# Patient Record
Sex: FEMALE | Race: BLACK OR AFRICAN AMERICAN | NOT HISPANIC OR LATINO | Employment: FULL TIME | RURAL
[De-identification: names, ages, dates, MRNs, and addresses within clinical notes are randomized per-mention and may not be internally consistent; named-entity substitution may affect disease eponyms.]

---

## 2022-08-25 ENCOUNTER — HOSPITAL ENCOUNTER (EMERGENCY)
Facility: HOSPITAL | Age: 32
Discharge: HOME | End: 2022-08-25

## 2022-08-25 VITALS
OXYGEN SATURATION: 99 % | HEART RATE: 81 BPM | TEMPERATURE: 98 F | DIASTOLIC BLOOD PRESSURE: 82 MMHG | RESPIRATION RATE: 17 BRPM | SYSTOLIC BLOOD PRESSURE: 132 MMHG | HEIGHT: 66 IN

## 2022-08-25 DIAGNOSIS — S56.011A: Primary | ICD-10-CM

## 2022-08-25 PROCEDURE — 99284 PR EMERGENCY DEPT VISIT,LEVEL IV: ICD-10-PCS | Mod: ,,, | Performed by: NURSE PRACTITIONER

## 2022-08-25 PROCEDURE — 99283 EMERGENCY DEPT VISIT LOW MDM: CPT

## 2022-08-25 PROCEDURE — 99284 EMERGENCY DEPT VISIT MOD MDM: CPT | Mod: ,,, | Performed by: NURSE PRACTITIONER

## 2022-08-25 RX ORDER — IBUPROFEN 800 MG/1
800 TABLET ORAL 3 TIMES DAILY
Qty: 20 TABLET | Refills: 0 | Status: SHIPPED | OUTPATIENT
Start: 2022-08-25

## 2022-08-25 NOTE — Clinical Note
"Smita Varelalida Perez was seen and treated in our emergency department on 8/25/2022.  She may return to work on 08/28/2022.       If you have any questions or concerns, please don't hesitate to call.      MARTHA Rogers"

## 2022-08-26 NOTE — ED PROVIDER NOTES
Encounter Date: 8/25/2022       History     Chief Complaint   Patient presents with    Hand Pain     Patient presents to ER with complaint of right hand pain and cramping.  Patient states symptoms have been ongoing x 2 weeks.  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  She states over the last 2 days the pain has became unbearable.  She denies injury or known accident.  Her work asked her to be evaluated.     The history is provided by the patient. No  was used.     Review of patient's allergies indicates:  No Known Allergies  History reviewed. No pertinent past medical history.  History reviewed. No pertinent surgical history.  History reviewed. No pertinent family history.  Social History     Tobacco Use    Smoking status: Current Some Day Smoker    Smokeless tobacco: Never Used   Substance Use Topics    Alcohol use: Not Currently    Drug use: Yes     Types: Marijuana     Review of Systems   Musculoskeletal: Positive for myalgias (right hand pain and cramping.).   All other systems reviewed and are negative.      Physical Exam     Initial Vitals [08/25/22 2040]   BP Pulse Resp Temp SpO2   132/82 81 17 98.1 °F (36.7 °C) 99 %      MAP       --         Physical Exam    Nursing note and vitals reviewed.  Constitutional: She appears well-developed and well-nourished.   HENT:   Head: Normocephalic.   Right Ear: External ear normal.   Left Ear: External ear normal.   Nose: Nose normal.   Mouth/Throat: Oropharynx is clear and moist.   Eyes: Conjunctivae and EOM are normal. Pupils are equal, round, and reactive to light.   Neck: Neck supple.   Normal range of motion.  Cardiovascular: Normal rate, regular rhythm, normal heart sounds and intact distal pulses.   Pulmonary/Chest: Breath sounds normal.   Abdominal: Abdomen is soft. Bowel sounds are normal.   Musculoskeletal:         General: Tenderness and edema present. Normal range of motion.      Cervical  back: Normal range of motion and neck supple.      Comments: Right hand edema at base of thumb and tenderness to palpation with flexion and extension of thumb and index finger.      Neurological: She is alert and oriented to person, place, and time. She has normal strength. GCS score is 15. GCS eye subscore is 4. GCS verbal subscore is 5. GCS motor subscore is 6.   Skin: Skin is warm and dry. Capillary refill takes less than 2 seconds.   Psychiatric: She has a normal mood and affect. Her behavior is normal. Judgment and thought content normal.         Medical Screening Exam   See Full Note    ED Course   Procedures  Labs Reviewed - No data to display       Imaging Results          X-Ray Hand 3 view Right (In process)                  Medications - No data to display                    Clinical Impression:   Final diagnoses:  [S56.011A] Strain of flexor muscle, fascia and tendon of right thumb at forearm level, initial encounter (Primary)          ED Disposition Condition    Discharge Stable        ED Prescriptions     Medication Sig Dispense Start Date End Date Auth. Provider    ibuprofen (ADVIL,MOTRIN) 800 MG tablet Take 1 tablet (800 mg total) by mouth 3 (three) times daily. 20 tablet 8/25/2022  MARTHA Rogers        Follow-up Information     Follow up With Specialties Details Why Contact Info    [facility] Schedule an appointment as soon as possible for a visit in 2 days If symptoms worsen [address] [place] [address] [employer] [facility] [id number]  [phone]             MARTHA Rogers  08/25/22 1634

## 2022-08-26 NOTE — ED TRIAGE NOTES
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 Complains of left thumb pain and palm pain

## 2022-08-26 NOTE — DISCHARGE INSTRUCTIONS
Wear splint x 3 days.    Take medication as prescribed.   Rest, ice and elevate right hand.   Follow up with [facility] on Monday if symptoms do not improve.   Return to ER with new or worsening symptoms.